# Patient Record
Sex: FEMALE | Race: WHITE
[De-identification: names, ages, dates, MRNs, and addresses within clinical notes are randomized per-mention and may not be internally consistent; named-entity substitution may affect disease eponyms.]

---

## 2018-09-15 ENCOUNTER — HOSPITAL ENCOUNTER (EMERGENCY)
Dept: HOSPITAL 80 - FED | Age: 23
Discharge: HOME | End: 2018-09-15
Payer: COMMERCIAL

## 2018-09-15 VITALS — SYSTOLIC BLOOD PRESSURE: 135 MMHG | DIASTOLIC BLOOD PRESSURE: 78 MMHG

## 2018-09-15 DIAGNOSIS — J45.901: ICD-10-CM

## 2018-09-15 DIAGNOSIS — J20.8: Primary | ICD-10-CM

## 2018-09-15 NOTE — EDPHY
H & P


Stated Complaint: cold symptoms for a couple days, increasing SOB


Time Seen by Provider: 09/15/18 13:59





- Personal History


LMP (Females 10-55): Now





- Medical/Surgical History


Hx Asthma: No


Hx Chronic Respiratory Disease: No


Hx Diabetes: No


Hx Cardiac Disease: No


Hx Renal Disease: No


Hx Cirrhosis: No


Hx Alcoholism: No


Hx HIV/AIDS: No


Hx Splenectomy or Spleen Trauma: No


Other PMH: asthma





- Social History


Smoking Status: Never smoked


Constitutional: 


 Initial Vital Signs











Temperature (C)  37.4 C   09/15/18 13:51


 


Heart Rate  91   09/15/18 13:51


 


Respiratory Rate  18   09/15/18 13:51


 


Blood Pressure  135/78 H  09/15/18 13:51


 


O2 Sat (%)  96   09/15/18 13:51








 











O2 Delivery Mode               Room Air














Allergies/Adverse Reactions: 


 





No Known Allergies Allergy (Unverified 09/15/18 13:51)


 








Home Medications: 














 Medication  Instructions  Recorded


 


ALBUTEROL SULFATE  09/15/18


 


Ipratropium/Albuterol [Combivent 1 inh IH QID #1 mdi 09/15/18





Respimat Inhal Spray(*)]  


 


predniSONE 40 mg PO DAILY #10 tab 09/15/18














Medical Decision Making


ED Course/Re-evaluation: 





CHIEF COMPLAINT: Shortness of breath





HISTORY OF PRESENT ILLNESS:  


The patient is a 23 y/o female with a history of asthma complaining of 

worsening shortness of breath, onset several days ago. She developed cold-like 

symptoms several days ago, which have progressively worsened. She tried using 

her albuterol inhaler, without relief of symptoms. Denies headache, chest pain, 

abdominal pain, urinary or bowel complaints, numbness, paresthesias.





REVIEW OF SYSTEMS:  





A comprehensive 10 system review of systems is otherwise negative aside from 

elements mentioned in the history of present illness and medical decision 

making.





PHYSICAL EXAM:  





HR, BP, O2 Sat, RR.  Temp noted


General Appearance:  Alert, well hydrated, appropriate, and non-toxic appearing.


Head:  Atraumatic without scalp tenderness or obvious injury


Eyes:  Pupils equal, round, reactive to light and accommodation, EOMI, no trauma

, no injection.


Ears:  Clear bilaterally, no perforation, normal landmarks


Nose:  Atraumatic, no rhinorrhea, clear.


Throat:  There is no erythema or exudates, no lesions, normal tonsils, mucus 

membranes moist.


Neck:  Supple, 2+ carotid upstroke, nontender, no lymphadenopathy.


Respiratory:  No retractions, no distress, no wheezes, and no accessory muscle 

use.  Lungs are clear to auscultation bilaterally.


Cardiovascular:  Regular rate and rhythm, no murmurs, rubs, or gallops. 

Bilateral carotid, radial, dorsalis pedis, and posterior tibial pulses intact. 

Good capillary refill all extremities.


Gastrointestinal:  Abdomen is soft, nontender, non-distended, no masses, no 

rebound, no guarding, no peritoneal signs.


Musculoskeletal:  Normal active ROM of all extremities, atraumatic.


Neurological:  Alert, appropriate, and interactive.  The patient has normal 

DTRs and non-focal cranial nerves, motor, sensory, and cerebellar exam.


Skin:  No rashes, good turgor, no nodules on palpation.





Past medical history: Asthma


Past surgical history: Denies


Family history: Denies


Social history: Friend at bedside, student at , lives in [place]





DIAGNOSTICS/PROCEDURES/CRITICAL CARE TIME:  


Not indicated.





DIFFERENTIAL DIAGNOSIS:   


The differential diagnosis for the patient's shortness of breath and hypoxemia 

included but was not limited to viral bronchitis, pneumonia, myocardial 

infarction, acute mountain sickness, high altitude pulmonary edema, congestive 

heart failure, and pulmonary embolus.





MEDICAL DECISION MAKING: 


The patient is a 23 y/o female with a history of asthma presenting with 

worsening shortness of breath after developing a cold several days ago. Her 

symptoms did not improve after using her albuterol inhaler. She has a normal 

physical exam with normal lung sounds. Laboratory and imaging studies are not 

indicated at this time as her symptoms are consistent with viral bronchitis. 

DuoNeb and 40mg PO Prednisone administered.





1430: Reassessed patient, she is feeling better after the DuoNeb. I discussed 

Combivent Respimat inhaler and Prednisone prescription. Return precautions 

provided; patient is comfortable with this plan. 








- Data Points


Medications Given: 


 








Discontinued Medications





Albuterol/Ipratropium (Duoneb)  3 ml IH EDNOW ONE


   Stop: 09/15/18 14:16


   Last Admin: 09/15/18 14:17 Dose:  3 ml


Prednisone (Prednisone)  40 mg PO EDNOW ONE


   Stop: 09/15/18 14:17


   Last Admin: 09/15/18 14:24 Dose:  40 mg








Departure





- Departure


Disposition: Home, Routine, Self-Care


Clinical Impression: 


 Viral bronchitis





Asthma exacerbation


Qualifiers:


 Asthma severity: mild Asthma persistence: unspecified Qualified Code(s): 

J45.901 - Unspecified asthma with (acute) exacerbation





Condition: Good


Instructions:  Asthma (ED), Acute Bronchitis (ED), Shortness of Breath (ED)


Additional Instructions: 


1. Use the Combivent Respimat Inhaler as prescribed.


2. Take Prednisone as prescribed.


3. Follow-up with your primary doctor within 72 hours.  


4. Return to the Emergency Department for fever, chest pain, shortness of breath

, increasing pain or other worsening of condition.





Referrals: 


GIGI ROSALES [Primary Care Provider] - As per Instructions


Prescriptions: 


Ipratropium/Albuterol [Combivent Respimat Inhal Spray(*)] 1 inh IH QID #1 mdi


predniSONE 40 mg PO DAILY #10 tab


Report Scribed for: Thomas Marquez


Report Scribed by: Cely Maldonado


Date of Report: 09/15/18


Time of Report: 14:14